# Patient Record
Sex: FEMALE | Race: ASIAN | Employment: STUDENT | URBAN - METROPOLITAN AREA
[De-identification: names, ages, dates, MRNs, and addresses within clinical notes are randomized per-mention and may not be internally consistent; named-entity substitution may affect disease eponyms.]

---
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
## 2025-03-20 ENCOUNTER — OFFICE VISIT (OUTPATIENT)
Dept: OCCUPATIONAL MEDICINE | Age: 20
End: 2025-03-20
Attending: PHYSICIAN ASSISTANT

## 2025-03-20 DIAGNOSIS — Z01.84 IMMUNITY STATUS TESTING: Primary | ICD-10-CM

## 2025-03-20 LAB
HBV SURFACE AB SER QL: NONREACTIVE
HBV SURFACE AB SERPL IA-ACNC: 5.55 MIU/ML
RUBV IGG SER QL: POSITIVE
RUBV IGG SER-ACNC: 97 IU/ML (ref 10–?)

## 2025-03-20 PROCEDURE — 86735 MUMPS ANTIBODY: CPT

## 2025-03-20 PROCEDURE — 86762 RUBELLA ANTIBODY: CPT

## 2025-03-20 PROCEDURE — 86706 HEP B SURFACE ANTIBODY: CPT

## 2025-03-20 PROCEDURE — 86480 TB TEST CELL IMMUN MEASURE: CPT

## 2025-03-20 PROCEDURE — 86765 RUBEOLA ANTIBODY: CPT

## 2025-03-20 PROCEDURE — 86787 VARICELLA-ZOSTER ANTIBODY: CPT

## 2025-03-21 LAB
MEV IGG SER-ACNC: 287 AU/ML (ref 16.5–?)
MUV IGG SER IA-ACNC: 117 AU/ML (ref 11–?)
VZV IGG SER IA-ACNC: 16.1 (ref 1–?)

## 2025-03-22 LAB
M TB IFN-G CD4+ T-CELLS BLD-ACNC: 0.04 IU/ML
M TB TUBERC IFN-G BLD QL: NEGATIVE
M TB TUBERC IGNF/MITOGEN IGNF CONTROL: 2.72 IU/ML
QFT TB1 AG MINUS NIL: 0 IU/ML
QFT TB2 AG MINUS NIL: 0 IU/ML

## 2025-06-21 ENCOUNTER — APPOINTMENT (OUTPATIENT)
Dept: GENERAL RADIOLOGY | Facility: HOSPITAL | Age: 20
End: 2025-06-21
Attending: PEDIATRICS
Payer: COMMERCIAL

## 2025-06-21 ENCOUNTER — HOSPITAL ENCOUNTER (EMERGENCY)
Facility: HOSPITAL | Age: 20
Discharge: HOME | End: 2025-06-21
Attending: PEDIATRICS
Payer: COMMERCIAL

## 2025-06-21 ENCOUNTER — APPOINTMENT (OUTPATIENT)
Dept: ULTRASOUND IMAGING | Facility: HOSPITAL | Age: 20
End: 2025-06-21
Attending: PEDIATRICS
Payer: COMMERCIAL

## 2025-06-21 VITALS
WEIGHT: 113.75 LBS | SYSTOLIC BLOOD PRESSURE: 111 MMHG | DIASTOLIC BLOOD PRESSURE: 72 MMHG | OXYGEN SATURATION: 100 % | BODY MASS INDEX: 18.95 KG/M2 | HEART RATE: 76 BPM | HEIGHT: 65 IN | RESPIRATION RATE: 18 BRPM

## 2025-06-21 DIAGNOSIS — R11.2 NAUSEA AND VOMITING IN ADULT: ICD-10-CM

## 2025-06-21 DIAGNOSIS — N94.6 MENSTRUAL CRAMPS: Primary | ICD-10-CM

## 2025-06-21 LAB
ALBUMIN SERPL-MCNC: 4.7 G/DL (ref 3.2–4.8)
ALBUMIN/GLOB SERPL: 2 {RATIO} (ref 1–2)
ALP LIVER SERPL-CCNC: 40 U/L (ref 52–144)
ALT SERPL-CCNC: 15 U/L (ref 10–49)
ANION GAP SERPL CALC-SCNC: 9 MMOL/L (ref 0–18)
AST SERPL-CCNC: 22 U/L (ref ?–34)
B-HCG UR QL: NEGATIVE
BASOPHILS # BLD AUTO: 0.03 X10(3) UL (ref 0–0.2)
BASOPHILS NFR BLD AUTO: 0.5 %
BILIRUB SERPL-MCNC: 0.7 MG/DL (ref 0.3–1.2)
BILIRUB UR QL STRIP.AUTO: NEGATIVE
BUN BLD-MCNC: 10 MG/DL (ref 9–23)
CALCIUM BLD-MCNC: 9.1 MG/DL (ref 8.7–10.6)
CHLORIDE SERPL-SCNC: 104 MMOL/L (ref 98–112)
CO2 SERPL-SCNC: 27 MMOL/L (ref 21–32)
CREAT BLD-MCNC: 0.86 MG/DL (ref 0.55–1.02)
EGFRCR SERPLBLD CKD-EPI 2021: 100 ML/MIN/1.73M2 (ref 60–?)
EOSINOPHIL # BLD AUTO: 0.18 X10(3) UL (ref 0–0.7)
EOSINOPHIL NFR BLD AUTO: 3.2 %
ERYTHROCYTE [DISTWIDTH] IN BLOOD BY AUTOMATED COUNT: 12.2 %
GLOBULIN PLAS-MCNC: 2.4 G/DL (ref 2–3.5)
GLUCOSE BLD-MCNC: 141 MG/DL (ref 70–99)
GLUCOSE UR STRIP.AUTO-MCNC: NORMAL MG/DL
HCT VFR BLD AUTO: 36.6 % (ref 35–48)
HGB BLD-MCNC: 12.7 G/DL (ref 12–16)
IMM GRANULOCYTES # BLD AUTO: 0.01 X10(3) UL (ref 0–1)
IMM GRANULOCYTES NFR BLD: 0.2 %
KETONES UR STRIP.AUTO-MCNC: NEGATIVE MG/DL
LEUKOCYTE ESTERASE UR QL STRIP.AUTO: NEGATIVE
LIPASE SERPL-CCNC: 28 U/L (ref 12–53)
LYMPHOCYTES # BLD AUTO: 1.68 X10(3) UL (ref 1.5–5)
LYMPHOCYTES NFR BLD AUTO: 29.6 %
MCH RBC QN AUTO: 29.3 PG (ref 26–34)
MCHC RBC AUTO-ENTMCNC: 34.7 G/DL (ref 31–37)
MCV RBC AUTO: 84.5 FL (ref 80–100)
MONOCYTES # BLD AUTO: 0.37 X10(3) UL (ref 0.1–1)
MONOCYTES NFR BLD AUTO: 6.5 %
NEUTROPHILS # BLD AUTO: 3.4 X10 (3) UL (ref 1.5–7.7)
NEUTROPHILS # BLD AUTO: 3.4 X10(3) UL (ref 1.5–7.7)
NEUTROPHILS NFR BLD AUTO: 60 %
NITRITE UR QL STRIP.AUTO: NEGATIVE
OSMOLALITY SERPL CALC.SUM OF ELEC: 291 MOSM/KG (ref 275–295)
PH UR STRIP.AUTO: 7.5 [PH] (ref 5–8)
PLATELET # BLD AUTO: 168 10(3)UL (ref 150–450)
POTASSIUM SERPL-SCNC: 3.5 MMOL/L (ref 3.5–5.1)
PROT SERPL-MCNC: 7.1 G/DL (ref 5.7–8.2)
RBC # BLD AUTO: 4.33 X10(6)UL (ref 3.8–5.3)
RBC #/AREA URNS AUTO: >10 /HPF
SODIUM SERPL-SCNC: 140 MMOL/L (ref 136–145)
SP GR UR STRIP.AUTO: 1.02 (ref 1–1.03)
UROBILINOGEN UR STRIP.AUTO-MCNC: NORMAL MG/DL
WBC # BLD AUTO: 5.7 X10(3) UL (ref 4–11)

## 2025-06-21 PROCEDURE — 93975 VASCULAR STUDY: CPT | Performed by: PEDIATRICS

## 2025-06-21 PROCEDURE — 83690 ASSAY OF LIPASE: CPT | Performed by: PEDIATRICS

## 2025-06-21 PROCEDURE — 87086 URINE CULTURE/COLONY COUNT: CPT | Performed by: PEDIATRICS

## 2025-06-21 PROCEDURE — 96374 THER/PROPH/DIAG INJ IV PUSH: CPT

## 2025-06-21 PROCEDURE — 80053 COMPREHEN METABOLIC PANEL: CPT | Performed by: PEDIATRICS

## 2025-06-21 PROCEDURE — 81001 URINALYSIS AUTO W/SCOPE: CPT | Performed by: PEDIATRICS

## 2025-06-21 PROCEDURE — 76856 US EXAM PELVIC COMPLETE: CPT | Performed by: PEDIATRICS

## 2025-06-21 PROCEDURE — 85025 COMPLETE CBC W/AUTO DIFF WBC: CPT | Performed by: PEDIATRICS

## 2025-06-21 PROCEDURE — 96361 HYDRATE IV INFUSION ADD-ON: CPT

## 2025-06-21 PROCEDURE — 96375 TX/PRO/DX INJ NEW DRUG ADDON: CPT

## 2025-06-21 PROCEDURE — 99285 EMERGENCY DEPT VISIT HI MDM: CPT

## 2025-06-21 PROCEDURE — 99284 EMERGENCY DEPT VISIT MOD MDM: CPT

## 2025-06-21 PROCEDURE — 74018 RADEX ABDOMEN 1 VIEW: CPT | Performed by: PEDIATRICS

## 2025-06-21 PROCEDURE — 81025 URINE PREGNANCY TEST: CPT

## 2025-06-21 RX ORDER — KETOROLAC TROMETHAMINE 30 MG/ML
15 INJECTION, SOLUTION INTRAMUSCULAR; INTRAVENOUS ONCE
Status: COMPLETED | OUTPATIENT
Start: 2025-06-21 | End: 2025-06-21

## 2025-06-21 RX ORDER — ONDANSETRON 4 MG/1
4 TABLET, ORALLY DISINTEGRATING ORAL EVERY 6 HOURS PRN
Qty: 10 TABLET | Refills: 0 | Status: SHIPPED | OUTPATIENT
Start: 2025-06-21 | End: 2025-06-28

## 2025-06-21 RX ORDER — ONDANSETRON 2 MG/ML
4 INJECTION INTRAMUSCULAR; INTRAVENOUS ONCE
Status: COMPLETED | OUTPATIENT
Start: 2025-06-21 | End: 2025-06-21

## 2025-06-21 NOTE — ED PROVIDER NOTES
Patient Seen in: [facility] Emergency Department        History  Chief Complaint   Patient presents with    Abdomen/Flank Pain     Stated Complaint: abd pain    Subjective:   19-year-old healthy female presents with acute onset of mid to lower abdomen/pelvic pain with cramping as well as nausea and vomiting that started 1 hour prior to arrival in the ED.  Patient started menstruating today however denies any fevers back pain diarrhea or urinary symptoms.  Denies history of dysmenorrhea or irregular menses.  No prior abdominal surgeries.  No analgesics taken prior to arrival in the ED.                      Objective:     History reviewed. No pertinent past medical history.           History reviewed. No pertinent surgical history.             Social History     Socioeconomic History    Marital status: Single   Tobacco Use    Smoking status: Never     Passive exposure: Never    Smokeless tobacco: Never   Vaping Use    Vaping status: Never Used   Substance and Sexual Activity    Alcohol use: Never    Drug use: Never                                Physical Exam    ED Triage Vitals [06/21/25 1320]   BP (!) 139/93   Pulse 82   Resp 18   Temp    Temp src    SpO2 100 %   O2 Device None (Room air)       Current Vitals:   Vital Signs  BP: (!) 139/93  Pulse: 82  Resp: 18    Oxygen Therapy  SpO2: 100 %  O2 Device: None (Room air)            Physical Exam  Vitals and nursing note reviewed.   Constitutional:       General: She is not in acute distress.     Appearance: Normal appearance. She is not ill-appearing, toxic-appearing or diaphoretic.   HENT:      Head: Normocephalic and atraumatic.      Nose: Nose normal.      Mouth/Throat:      Mouth: Mucous membranes are moist.      Pharynx: Oropharynx is clear.   Eyes:      Extraocular Movements: Extraocular movements intact.      Conjunctiva/sclera: Conjunctivae normal.      Pupils: Pupils are equal, round, and reactive to light.   Cardiovascular:      Rate and Rhythm: Normal  rate and regular rhythm.      Pulses: Normal pulses.      Heart sounds: Normal heart sounds.   Pulmonary:      Effort: Pulmonary effort is normal.      Breath sounds: Normal breath sounds.   Abdominal:      General: Abdomen is flat. There is no distension.      Palpations: Abdomen is soft.      Tenderness: There is no right CVA tenderness, left CVA tenderness, guarding or rebound.      Comments: Abdomen very soft, mild mid and suprapubic tenderness    No significant right lower quadrant or left lower quadrant tenderness    No significant right upper quadrant or left upper quadrant tenderness    No CVA tenderness    No rebound or guarding    Negative psoas/obturators   Musculoskeletal:         General: Normal range of motion.      Cervical back: Normal range of motion and neck supple.   Skin:     General: Skin is warm.      Capillary Refill: Capillary refill takes less than 2 seconds.   Neurological:      General: No focal deficit present.      Mental Status: She is alert and oriented to person, place, and time.      Cranial Nerves: No cranial nerve deficit.      Sensory: No sensory deficit.                 ED Course  Labs Reviewed   URINALYSIS, ROUTINE - Abnormal; Notable for the following components:       Result Value    Clarity Urine Turbid (*)     Blood Urine 3+ (*)     Protein Urine Trace (*)     WBC Urine 6-10 (*)     RBC Urine >10 (*)     Bacteria Urine Rare (*)     Squamous Epi. Cells Few (*)     All other components within normal limits    Narrative:     K   COMP METABOLIC PANEL (14) - Abnormal; Notable for the following components:    Glucose 141 (*)     Alkaline Phosphatase 40 (*)     All other components within normal limits   LIPASE - Normal   POCT PREGNANCY URINE - Normal   CBC WITH DIFFERENTIAL WITH PLATELET   URINE CULTURE, ROUTINE       ED Course as of 06/21/25 1428  ------------------------------------------------------------  Time: 06/21 1356  Comment: Serum lab work grossly  unremarkable  ------------------------------------------------------------  Time: 06/21 1403  Comment: KUB without signs of obstruction or free air  ------------------------------------------------------------  Time: 06/21 1654  Comment: Pelvic ultrasound mostly unremarkable.  Patient appears comfortable.  At this time low concern for acute surgical abdomen.  Will discharge home to continue as needed Tylenol/Motrin as needed Zofran oral hydration and close PCP follow-up with strict return precautions to the ED.     Assessment & Plan: Very well-appearing with likely menstrual cramping/PMS.  Low concern for acute abdomen or torsion.  Nonetheless we will obtain stat KUB pelvic ultrasound serum lab work UA and administer IV fluid bolus and Toradol.     Independent historian:   Pertinent co-morbidities affecting presentation: none   Differential diagnoses considered: I considered various etiologies / differetial diagosis including but not limited to, menstrual cramps, gas pain, gastroenteritis, low concern for torsion or acute abdomen. The patient was well-appearing and did not show any evidence of serious bacterial infection.  Diagnostic tests considered but not performed: Abdominal CT -low suspicion for acute abdomen at this time    ED Course:    Prescription drug management considerations: prn Zofran ODT  Consideration regarding hospitalization or escalation of care: none at this time  Social determinants of health: none       I have considered other serious etiologies for this patient's complaints, however the presentation is not consistent with such entities. Patient was screened and evaluated during this visit.   As a treating physician attending to the patient, I determined, within reasonable clinical confidence and prior to discharge, that an emergency medical condition was not or was no longer present. Patient or caregiver understands the course of events that occurred in the emergency department. Instructions  when to seek emergent medical care was reviewed. Advised parent or caregiver to follow up with primary care physician.        This report has been produced using speech recognition software and may contain errors related to that system including, but not limited to, errors in grammar, punctuation, and spelling, as well as words and phrases that possibly may have been recognized inappropriately.  If there are any questions or concerns, contact the dictating provider for clarification.                    MDM     Radiology:  Imaging ordered independently visualized and interpreted by myself (along with review of radiologist's interpretation) and noted the following: KUB without signs of obstruction or free air    US PELVIS W DOPPLER (QFJ=[id number])  Result Date: 6/21/2025  CONCLUSION:  1. Small right paraovarian cyst. 2. Trace pelvic fluid.  The degree may be physiologic. 3. Unremarkable appearance of the uterus and ovaries.    LOCATION:  [facility]    Dictated by (CST): Mario Sutherland MD on 6/21/2025 at 4:49 PM     Finalized by (CST): Mario Sutherland MD on 6/21/2025 at 4:53 PM       XR ABDOMEN (1 VIEW) (CPT=74018)  Result Date: 6/21/2025  CONCLUSION:  Bowel gas pattern is unremarkable.  Levoscoliosis of the lumbar spine is noted.   LOCATION:  [facility]   Dictated by (CST): Segun Mathias MD on 6/21/2025 at 2:01 PM     Finalized by (CST): Segun Mathias MD on 6/21/2025 at 2:01 PM         Labs:  ^^ Personally ordered, reviewed, and interpreted all unique tests ordered.  Clinically significant labs noted: serum lab work mostly unremarkable    Medications administered:  Medications   ketorolac (Toradol) 30 MG/ML injection 15 mg (15 mg Intravenous Given 6/21/25 1330)   ondansetron (Zofran) 4 MG/2ML injection 4 mg (4 mg Intravenous Given 6/21/25 1331)   sodium chloride 0.9 % IV bolus 1,000 mL (1,000 mL Intravenous New Bag 6/21/25 1331)       Pulse oximetry:  Pulse oximetry on room air is 100% and is normal.     Cardiac  monitoring:  Initial heart rate is 82 and is normal for age    Vital signs:  Vitals:    06/21/25 1317 06/21/25 1320 06/21/25 1657   BP:  (!) 139/93    Pulse:  82    Resp:  18    SpO2:  100% 100%   Weight: 51.6 kg     Height: 165.1 cm (5' 5\")         Chart review:  ^^ Review of prior external notes from unique sources (non-[facility] ED records): noted in history : none       Disposition and Plan     Clinical Impression:  1. Menstrual cramps    2. Nausea and vomiting in adult         Disposition:  Discharge  6/21/2025  4:55 pm    Follow-up:  Trent Dai MD  [address] [address]  [phone]    Schedule an appointment as soon as possible for a visit      [facility] Emergency Department  [address] [facility] [address]  [phone]  Follow up  If symptoms worsen          Medications Prescribed:  Current Discharge Medication List        START taking these medications    Details   ondansetron 4 MG Oral Tablet Dispersible Take 1 tablet (4 mg total) by mouth every 6 (six) hours as needed.  Qty: 10 tablet, Refills: 0                   Supplementary Documentation:

## 2025-06-21 NOTE — ED INITIAL ASSESSMENT (HPI)
Patient arrives with complaint of mid abdominal pain, nausea and vomiting x1 hour PTA. Patient states she is on her period that started today. Patient states she felt hot earlier and like she wanted to pass out and then the pain started. Patient states she threw up once prior to arrival.